# Patient Record
Sex: FEMALE | Race: WHITE | NOT HISPANIC OR LATINO | Employment: UNEMPLOYED | URBAN - METROPOLITAN AREA
[De-identification: names, ages, dates, MRNs, and addresses within clinical notes are randomized per-mention and may not be internally consistent; named-entity substitution may affect disease eponyms.]

---

## 2023-07-25 DIAGNOSIS — O09.92 HIGH-RISK PREGNANCY IN SECOND TRIMESTER: Primary | ICD-10-CM

## 2023-08-03 ENCOUNTER — OFFICE VISIT (OUTPATIENT)
Dept: MATERNAL FETAL MEDICINE | Facility: CLINIC | Age: 33
End: 2023-08-03
Payer: MEDICAID

## 2023-08-03 VITALS
OXYGEN SATURATION: 98 % | BODY MASS INDEX: 33.04 KG/M2 | RESPIRATION RATE: 20 BRPM | WEIGHT: 218 LBS | HEIGHT: 68 IN | DIASTOLIC BLOOD PRESSURE: 60 MMHG | SYSTOLIC BLOOD PRESSURE: 112 MMHG | HEART RATE: 84 BPM

## 2023-08-03 DIAGNOSIS — Q51.28 UTERINE SEPTUM: Primary | ICD-10-CM

## 2023-08-03 DIAGNOSIS — N96 HISTORY OF MULTIPLE MISCARRIAGES: ICD-10-CM

## 2023-08-03 DIAGNOSIS — Z82.79 FAMILY HISTORY OF CONGENITAL HEART DEFECT: ICD-10-CM

## 2023-08-03 DIAGNOSIS — O09.92 HIGH-RISK PREGNANCY IN SECOND TRIMESTER: ICD-10-CM

## 2023-08-03 PROCEDURE — 99203 PR OFFICE/OUTPT VISIT, NEW, LEVL III, 30-44 MIN: ICD-10-PCS | Mod: TH,25,S$GLB, | Performed by: OBSTETRICS & GYNECOLOGY

## 2023-08-03 PROCEDURE — 76811 OB US DETAILED SNGL FETUS: CPT | Mod: S$GLB,,, | Performed by: OBSTETRICS & GYNECOLOGY

## 2023-08-03 PROCEDURE — 99203 OFFICE O/P NEW LOW 30 MIN: CPT | Mod: TH,25,S$GLB, | Performed by: OBSTETRICS & GYNECOLOGY

## 2023-08-03 PROCEDURE — 1159F PR MEDICATION LIST DOCUMENTED IN MEDICAL RECORD: ICD-10-PCS | Mod: CPTII,S$GLB,, | Performed by: OBSTETRICS & GYNECOLOGY

## 2023-08-03 PROCEDURE — 3074F SYST BP LT 130 MM HG: CPT | Mod: CPTII,S$GLB,, | Performed by: OBSTETRICS & GYNECOLOGY

## 2023-08-03 PROCEDURE — 3074F PR MOST RECENT SYSTOLIC BLOOD PRESSURE < 130 MM HG: ICD-10-PCS | Mod: CPTII,S$GLB,, | Performed by: OBSTETRICS & GYNECOLOGY

## 2023-08-03 PROCEDURE — 1159F MED LIST DOCD IN RCRD: CPT | Mod: CPTII,S$GLB,, | Performed by: OBSTETRICS & GYNECOLOGY

## 2023-08-03 PROCEDURE — 3008F PR BODY MASS INDEX (BMI) DOCUMENTED: ICD-10-PCS | Mod: CPTII,S$GLB,, | Performed by: OBSTETRICS & GYNECOLOGY

## 2023-08-03 PROCEDURE — 3008F BODY MASS INDEX DOCD: CPT | Mod: CPTII,S$GLB,, | Performed by: OBSTETRICS & GYNECOLOGY

## 2023-08-03 PROCEDURE — 3078F PR MOST RECENT DIASTOLIC BLOOD PRESSURE < 80 MM HG: ICD-10-PCS | Mod: CPTII,S$GLB,, | Performed by: OBSTETRICS & GYNECOLOGY

## 2023-08-03 PROCEDURE — 3078F DIAST BP <80 MM HG: CPT | Mod: CPTII,S$GLB,, | Performed by: OBSTETRICS & GYNECOLOGY

## 2023-08-03 PROCEDURE — 76811 PR US, OB FETAL EVAL & EXAM, TRANSABDOM,FIRST GESTATION: ICD-10-PCS | Mod: S$GLB,,, | Performed by: OBSTETRICS & GYNECOLOGY

## 2023-08-03 NOTE — PROGRESS NOTES
Initial Maternal-Fetal Medicine evaluation:    Indications:  1.  Pregnancy at 18 weeks 6 days   2. Septate uterus  3. Father baby with spina bifida occulta  4. Daughter born with a heart murmur.    5.  History of 3 spontaneous miscarriages, 3 elective termination of the pregnancy, and 1 full-term delivery.      Dear Yasmin Villegas CNM,     Thank you very much for asking us see your patient.  She presents today as a 32-year-old  8 para 1061.  She is currently 18 weeks 6 days.  She was referred because of having a septate uterus.  This was thought to have cause 3 spontaneous miscarriages.  She also has a history of 3 elective termination of pregnancies.  None of these were associated with any major long-term complications.  Her only discussed all pregnancy delivered on August 15, 2019.  The baby weighed 8 lb and 10 oz and was a spontaneous vaginal delivery.  The baby was born with a person heart murmur, and an umbilical hernia, but the child is doing well now.    This patient's past medical history is positive for having depression, anemia, posttraumatic stress disorder, and the septate uterus.    Past surgeries include wisdom teeth extraction.      Family history is positive for spina bifida occulta in the father this baby.  She also has a daughter with the persistent heart murmur.  She is not required surgery.      Social history:  She is a former smoker of both cigarettes and THC.  She claims to have quit since .  She has a history of substance abuse, but nothing during the current pregnancy.    Physical findings:  In general is healthy-appearing female in no distress.  Blood pressure 112/60, pulse 84.  Her face is not edematous and eyes are nonicteric.  Nose and throat are clear.  The abdominal exam is benign.  The uterus is nontender and appropriate size the gestational age.  No peripheral edema is noted.  Reflexes are 1+ and equal bilaterally.    Ultrasound findings:  Please note that a separate  report will be provided to you which will have a detailed description of our findings today.  A brief summary is single fetus is seen.  There is adequate amniotic fluid present.  The placenta is anterior without evidence of placenta previa.  Biometric measurements document normal growth which also confirmed the gestational age and estimated date of confinement.  The anatomical survey was somewhat difficult to adequately visualize because of the fetal positioning, but no anomalies are detected.  The cervix is long and closed.  I carefully searched and found no evidence of a uterine septum.    Impression:   1. Pregnancy 18 weeks 6 days   2. A history of multiple first-trimester miscarriages and suspicion for uterine septum.  I could not see a uterine septum today.  The fact that she had 1 full-term delivery indicates that the prognosis for another full-term delivery is actually quite good at this point.  The cervix being long and closed indicates no evidence of cervical insufficiency.  3.  She had a previous child born with a heart murmur.  She was told that something that was supposed to close does not close, but she never required surgery and is doing well now.  Views of the fetal heart were not ideal today because of the early gestational age, and the fetal positioning.  However, I did not see any specific heart defects.  The recurrence risk for congenital heart defects is 2 to 5 %.  4.  The father baby has spina bifida occulta.  There is no evidence of this in the current fetus she is carrying.  Views of the spine today were adequate.    Recommendations:  1.  With your permission I would like see back in 4 weeks for repeat assessment fetal growth well being and to try to visualize the entire fetal anatomy better.  2. If inadequate views of fetal heart or again obtained, we may refer this patient for a formal fetal echocardiogram with Dr. Cuellar, because of a previous child was born with an apparent congenital  heart defect.  3.  Patients who have a uterine septum are at increased risk for miscarriage, and  delivery.  However, because I do not see uterine septum, and because she had a term delivery in her only successful pregnancy, I feel the risk for  birth is probably the same as the general population risk.  Another reassuring factor is the cervix being long and closed at this time.      Thanks again for the referral.  Please feel free to call us with any questions.

## 2023-08-03 NOTE — LETTER
August 3, 2023        Yasmin Villegas CNM  [address] [facility] [address]             [place] - Maternal Fetal Medicine  [address]  [place] [address]  Phone: [phone]  Fax: [phone]   Patient: Carla Liriano   MR Number: [id number]   YOB: 1990   Date of Visit: 8/3/2023       Dear Dr. Foley:    Thank you for referring Carla Liriano to me for evaluation. Attached you will find relevant portions of my assessment and plan of care.    If you have questions, please do not hesitate to call me. I look forward to following Carla Liriano along with you.    Sincerely,      Matthew Augustin MD            CC  No Recipients    Enclosure

## 2023-08-03 NOTE — PROGRESS NOTES
"Carla is here for initial MFM consultation, referred by Yasmin Villegas CNM at Dr. Alaina Jr's office for septate uterus, FOB with spina bifida occulta.    She is feeling fetal movement "since 10 weeks."    Carla denies vaginal bleeding, loss of fluid, recurrent cramping.          Vitals:    08/03/23 1233   BP: 112/60   Pulse: 84   Resp: 20   SpO2: 98%   Weight: 98.9 kg (218 lb)   Height: 5' 8" (1.727 m)      BMI:                    33.15 kg/m^2               "

## 2023-08-21 DIAGNOSIS — O09.92 HIGH-RISK PREGNANCY IN SECOND TRIMESTER: Primary | ICD-10-CM

## 2023-09-07 ENCOUNTER — PROCEDURE VISIT (OUTPATIENT)
Dept: MATERNAL FETAL MEDICINE | Facility: CLINIC | Age: 33
End: 2023-09-07
Payer: MEDICAID

## 2023-09-07 VITALS
DIASTOLIC BLOOD PRESSURE: 52 MMHG | OXYGEN SATURATION: 99 % | HEART RATE: 90 BPM | RESPIRATION RATE: 18 BRPM | SYSTOLIC BLOOD PRESSURE: 116 MMHG | WEIGHT: 222 LBS | BODY MASS INDEX: 33.75 KG/M2

## 2023-09-07 DIAGNOSIS — O09.92 HIGH-RISK PREGNANCY IN SECOND TRIMESTER: ICD-10-CM

## 2023-09-07 PROCEDURE — 76816 OB US FOLLOW-UP PER FETUS: CPT | Mod: NDTC,S$GLB,, | Performed by: OBSTETRICS & GYNECOLOGY

## 2023-09-07 PROCEDURE — 76816 PR  US,PREGNANT UTERUS,F/U,TRANSABD APP: ICD-10-PCS | Mod: NDTC,S$GLB,, | Performed by: OBSTETRICS & GYNECOLOGY

## 2023-09-07 PROCEDURE — 76817 TRANSVAGINAL US OBSTETRIC: CPT | Mod: NDTC,S$GLB,, | Performed by: OBSTETRICS & GYNECOLOGY

## 2023-09-07 PROCEDURE — 76817 PR US, OB, TRANSVAG APPROACH: ICD-10-PCS | Mod: NDTC,S$GLB,, | Performed by: OBSTETRICS & GYNECOLOGY

## 2023-09-07 NOTE — PROGRESS NOTES
Carla is here for followup [facility] consultation for septate uterus, FOB with spina bifida occulta, referred by Yasmin Villegas CNM.    She is feeling fetal movement.    Carla denies vaginal bleeding, loss of fluid, recurrent cramping.    Vitals:    09/07/23 1328   BP: (!) 116/52   Pulse: 90   Resp: 18   SpO2: 99%   Weight: 100.7 kg (222 lb)     BMI:                    33.75 kg/m^2

## 2023-09-07 NOTE — PROGRESS NOTES
Follow up Maternal-Fetal Medicine evaluation via Telemedicine:    Indications:  1.  Pregnancy at 23 weeks 6 days   2.  Previous concern for Septate uterus however not confirmed with [facility]  3. Father baby with spina bifida occulta  4. Daughter born with a heart murmur.    5.  History of 3 spontaneous miscarriages, 3 elective termination of the pregnancy, and 1 full-term delivery.    6. Obesity Class 1     Dear Yasmin Villegas CNM,     Thank you very much for asking us see your patient.  She presents today as a 32-year-old  8 para 1061.  She is currently 23 weeks 6 days.  She was referred because of having a septate uterus however with her last visit here Dr. Augustin did not see evidence of this.  She reports that her pregnancy has been progressing well.  Denies any cramping, contractions, LOF or vaginal bleeding and does report good fetal movement.        Physical findings:  Vitals:    23 1328   BP: (!) 116/52   Pulse: 90   Resp: 18     Physical exam:  Gen: WDWN in NAD  HEENT: WNL  Abdomen: gravid  Skin: No rash or jaundice  Extremities: Symmetric in size, no clubbing, cyanosis, or edema  Neuro: Grossly intact  Exam via Telemedicine      Ultrasound findings:  Single intrauterine pregnancy measuring 23 weeks and 6 days with an estimated fetal weight of 617g.  This is consistent with a previously determined HUSSEIN.  The fetus is in the breech presentation.  The placenta is anterior without evidence of previa.  Measures greater than 3cm for the internal cervical os.  The amniotic fluid is normal.  Completion of the anatomical survey was performed and there are no structural anomalies or aneuploidy on ultrasound today.  I did get excellent views of the heart today and everything appears to be within normal limits.  Her cervical length today was also greater than 5cm.        Impression:   1. Pregnancy 23 weeks 6 days with normal growth and fluid and anatomy  2. Normal cervical length    Recommendations:  1.   Patient was instructed to keep all of her upcoming appointments with you.  At this point time no further follow-up is recommended in the Maternal-Fetal medicine office.  2.  Again I could not appreciate significant septum on today's ultrasound and I do agree that the likelihood of  labor is low given that she has had a term previous delivery.  Her cervical length here today is within normal limits.  3. If anything should arise later on in the pregnancy please do not hesitate to refer back to maternal fetal Medicine.    Thanks again for the referral.  Please feel free to call us with any questions.      Sincerely,     Amanda Pereira, DO

## 2023-09-07 NOTE — LETTER
September 7, 2023        Yasmin Villegas CNM  [address] [facility] [address]             [place] - Maternal Fetal Medicine  [address]  [place] [address]  Phone: [phone]  Fax: [phone]   Patient: Carla Liriano   MR Number: [id number]   YOB: 1990   Date of Visit: 9/7/2023       Dear Ms. Villegas:    Thank you for referring Carla Liriano to me for evaluation. Below are the relevant portions of my assessment and plan of care.            If you have questions, please do not hesitate to call me. I look forward to following Carla along with you.    Sincerely,      Amanda Pereira, DO           CC  No Recipients

## 2024-10-29 DIAGNOSIS — O09.92 HIGH-RISK PREGNANCY IN SECOND TRIMESTER: Primary | ICD-10-CM

## 2024-11-11 ENCOUNTER — OFFICE VISIT (OUTPATIENT)
Dept: MATERNAL FETAL MEDICINE | Facility: CLINIC | Age: 34
End: 2024-11-11
Payer: MEDICAID

## 2024-11-11 VITALS
BODY MASS INDEX: 36.8 KG/M2 | RESPIRATION RATE: 20 BRPM | HEART RATE: 105 BPM | SYSTOLIC BLOOD PRESSURE: 120 MMHG | OXYGEN SATURATION: 98 % | WEIGHT: 242 LBS | DIASTOLIC BLOOD PRESSURE: 60 MMHG

## 2024-11-11 DIAGNOSIS — O09.92 HIGH-RISK PREGNANCY IN SECOND TRIMESTER: ICD-10-CM

## 2024-11-11 PROCEDURE — 3074F SYST BP LT 130 MM HG: CPT | Mod: CPTII,,, | Performed by: OBSTETRICS & GYNECOLOGY

## 2024-11-11 PROCEDURE — 3078F DIAST BP <80 MM HG: CPT | Mod: CPTII,,, | Performed by: OBSTETRICS & GYNECOLOGY

## 2024-11-11 PROCEDURE — 76811 OB US DETAILED SNGL FETUS: CPT | Mod: 26,S$PBB,, | Performed by: OBSTETRICS & GYNECOLOGY

## 2024-11-11 PROCEDURE — 1159F MED LIST DOCD IN RCRD: CPT | Mod: CPTII,,, | Performed by: OBSTETRICS & GYNECOLOGY

## 2024-11-11 PROCEDURE — 3008F BODY MASS INDEX DOCD: CPT | Mod: CPTII,,, | Performed by: OBSTETRICS & GYNECOLOGY

## 2024-11-11 PROCEDURE — 99203 OFFICE O/P NEW LOW 30 MIN: CPT | Mod: 25,S$PBB,TH, | Performed by: OBSTETRICS & GYNECOLOGY

## 2024-11-11 RX ORDER — SYRINGE WITH NEEDLE, INSULIN
1 SYRINGE, EMPTY DISPOSABLE MISCELLANEOUS
COMMUNITY
Start: 2024-08-28

## 2024-11-11 NOTE — PROGRESS NOTES
Carla is here for initial MFM consultation, referred by Yasmin Villegas CNM for ? Uterine septum, FOB born with spina bifida, 1st child with ASD. She was seen in our clinic 9/7/23.    She is feeling fetal movement.    Carla denies vaginal bleeding, loss of fluid, recurrent cramping.      Vitals:    11/11/24 1329   BP: 120/60   Pulse: 105   Resp: 20   SpO2: 98%   Weight: 109.8 kg (242 lb)      BMI:                    36.8 kg/m^2

## 2024-11-11 NOTE — LETTER
November 11, 2024        Yasmin Villegas CNM  [address] [facility] [address]             [place] - Maternal Fetal Medicine  [address]  [place] [address]  Phone: [phone]  Fax: [phone]   Patient: Carla Liriano   MR Number: [id number]   YOB: 1990   Date of Visit: 11/11/2024       Dear Dr. Foley:    Thank you for referring Carla Liriano to me for evaluation. Attached you will find relevant portions of my assessment and plan of care.    If you have questions, please do not hesitate to call me. I look forward to following Carla Liriano along with you.    Sincerely,      Clifton Sosa MD            CC  No Recipients    Enclosure

## 2024-11-11 NOTE — PROGRESS NOTES
Maternal Fetal Medicine Consultation Note    Carla Liriano  ( 1990)    Reason for Consultation    Intrauterine pregnancy at 16w3d  History of multiple miscarriages  Possible uterine septum  Family history of congenital cardiac disease    Provider requesting consultation:  Yasmin Villegas CNM    Dear Yasmin,    Thank you very much for your referral of Carla Liriano who is a 33 y.o.  at 16w3d gestation to the Maternal Fetal Medicine Center of [facility].  Her Estimated Date of Delivery: 25.  I appreciate your request for imaging and consultation.    Past Medical History:    Your patient denies a history of hypertension, diabetes, cardiac disease, pulmonary disease, renal disease, autoimmune disease, spontaneous thrombosis, thyroid disease, and neurologic disease.  History of depression and  PTSD.    Obstetric History:  Patient was pregnant .  She delivered vaginally.  That child had an ASD.  She had a delivery in  at 40 weeks the baby weighed 7 lb 14 oz. it was a precipitous delivery.  The patient has had 3 elective interruptions of pregnancy.  She has also had 3 spontaneous miscarriages.    PSH:  [place] tooth extraction    Family hx:   Noncontributory.  The history is negative for congenital  cardiac defects, open neural tube defect, Down syndrome, aneuploidy, and common single gene disorders.      Social History:  Your patient denies tobacco use, alcohol use, and recreational drug use while pregnant.      Review of patient's allergies indicates:   Allergen Reactions    Levofloxacin Hives        Review of systems:  Your patient denies nausea, vomiting, musculoskeletal pain, weakness, headache, and pelvic pressure.    Physical exam:  Vitals:    24 1329   BP: 120/60   Pulse: 105   Resp: 20   SpO2: 98%   Weight: 109.8 kg (242 lb)     Body mass index is 36.8 kg/m².    General:    This is a well-developed well-nourished female in no apparent distress  HEENT:  Grossly within  normal limits.   No facial edema is noted.  The sclera appear normal  Abdomen:  Benign exam.  The uterus is nontender to palpation.  No masses are  noted.  Extremities: No significant peripheral edema is palpable.  Neuro: Grossly intact.  The patient is alert and oriented x3.  She ambulates without issue.  Psych:  The patient is appropriate for both mood and affect.    Ultrasound: A maternal fetal medicine ultrasound was performed today.  Please SEE ATTACHED REPORT IN THE [employer] [facility] for full details.  In summary, all looks well today at 16 weeks.  I got pretty good views of the heart not think there is a four-chamber with normal outflow tracts but would categorize it was suboptimal due to early gestation.  The remainder of the anatomy appears normal.  Fetal growth is appropriate.    Counseling:  The patient was counseled I do not think she has a uterine septum.  Be very unlikely for if gone to term twice with a uterine septum.  Additionally, it likely would have been noted on 1 of her prior miscarriages/D and C's.  She expressed understanding agreed.  She was informed that with a child with an ASD she probably has about a 5% chance of cardiac disease in the current pregnancy.  Today's imaging is reassuring.  She was informed we will see her back in 8 weeks which does an excellent time to look at the entire pregnancy including the heart.  She was made aware that if all looks well at that point then we likely will not have to see her back again.  As always, she was encouraged to live a healthy lifestyle with a well-rounded diet and to remain active during the pregnancy.    Impression:  Our initial [facility] assessment today is reassuring.  I see no evidence of fetal growth disturbance or an early anatomic abnormality.  The patient feels well and has no complaints.  She has no significant systemic disease.    Recommendations:  With your permission we will see the patient back here in the MFM Center in 8 weeks   The patient  was encouraged to live a healthy lifestyle.  Please call us if you have any question about the care of your patient.    Thanks once again for allowing us to participate in the care of your patient.  If you have any questions please feel free to call us at any time.    Sincerely,     Clifton Sosa MD  Maternal-Fetal Medicine     I spent a total of 30 minutes on this visit. This includes face to face time and non-face to face time preparing to see the patient (eg, review of tests), obtaining and/or reviewing separately obtained history, documenting clinical information in the electronic or other health record, independently interpreting results and communicating results to the patient/family/caregiver, or care coordinator.

## 2024-12-23 DIAGNOSIS — O09.92 HIGH-RISK PREGNANCY IN SECOND TRIMESTER: Primary | ICD-10-CM

## 2025-01-06 ENCOUNTER — PROCEDURE VISIT (OUTPATIENT)
Dept: MATERNAL FETAL MEDICINE | Facility: CLINIC | Age: 35
End: 2025-01-06
Payer: MEDICAID

## 2025-01-06 VITALS
BODY MASS INDEX: 37.4 KG/M2 | WEIGHT: 246 LBS | HEART RATE: 64 BPM | OXYGEN SATURATION: 99 % | DIASTOLIC BLOOD PRESSURE: 70 MMHG | RESPIRATION RATE: 20 BRPM | SYSTOLIC BLOOD PRESSURE: 108 MMHG

## 2025-01-06 DIAGNOSIS — O09.92 HIGH-RISK PREGNANCY IN SECOND TRIMESTER: ICD-10-CM

## 2025-01-06 PROCEDURE — 99214 OFFICE O/P EST MOD 30 MIN: CPT | Mod: S$PBB,TH,, | Performed by: OBSTETRICS & GYNECOLOGY

## 2025-01-06 PROCEDURE — 76816 OB US FOLLOW-UP PER FETUS: CPT | Mod: 26,S$PBB,, | Performed by: OBSTETRICS & GYNECOLOGY

## 2025-01-06 RX ORDER — OMEPRAZOLE 20 MG/1
20 CAPSULE, DELAYED RELEASE ORAL 2 TIMES DAILY
COMMUNITY
Start: 2024-12-29

## 2025-01-06 RX ORDER — GLUCOSAM/CHONDRO/HERB 149/HYAL 750-100 MG
TABLET ORAL
COMMUNITY

## 2025-01-06 NOTE — PROGRESS NOTES
Carla is here for followup [facility] consultation for family history (daughter) with VSD, and FOB with spina bifida occulta referred by Yasmin Villegas CNM.    She is feeling fetal movement.    Carla denies vaginal bleeding, loss of fluid, recurrent contractions.    She is taking Omeprazole 20 mg PO daily, prenatal vitamin and Omega 3 fish oil daily.    Vitals:    01/06/25 1202   BP: 108/70   Pulse: 64   Resp: 20   SpO2: 99%   Weight: 111.6 kg (246 lb)     BMI:                    37.4 kg/m^2

## 2025-01-06 NOTE — LETTER
January 6, 2025        Sohail Foley MD  [address] [facility] [address]             [place] - Maternal Fetal Medicine  [address]  [place] [address]  Phone: [phone]  Fax: [phone]   Patient: Carla Liriano   MR Number: [id number]   YOB: 1990   Date of Visit: 1/6/2025       Dear Ms. Villegas:    Thank you for referring Carla Liriano to me for evaluation. Below are the relevant portions of my assessment and plan of care.            If you have questions, please do not hesitate to call me. I look forward to following Carla along with you.    Sincerely,      Amanda Pereira, DO           CC  No Recipients

## 2025-01-06 NOTE — PROGRESS NOTES
Maternal Fetal Medicine Consultation Note    Carla Liriano  ( 1990)    Reason for Consultation    Intrauterine pregnancy at 24w3d  History of multiple miscarriages  Possible uterine septum  Family history of congenital cardiac disease    Provider requesting consultation:  Yasmin Villegas CNM    Dear Yasmin,    Thank you very much for your referral of Carla Liriano who is a 34 y.o.  at 24w3d gestation to the Maternal Fetal Medicine Center of [facility].  Her Estimated Date of Delivery: 25.  I appreciate your request for imaging and consultation.      Review of systems:  Your patient denies nausea, vomiting, musculoskeletal pain, weakness, headache, and pelvic pressure.    Physical exam:  /70   Pulse 64   Resp 20   Wt 111.6 kg (246 lb)   SpO2 99%   BMI 37.40 kg/m²    General:    This is a well-developed well-nourished female in no apparent distress  HEENT:  Grossly within normal limits.   No facial edema is noted.  The sclera appear normal  Abdomen:  Benign exam.  The uterus is nontender to palpation.  No masses are  noted.  Extremities: No significant peripheral edema is palpable.  Neuro: Grossly intact.  The patient is alert and oriented x3.  She ambulates without issue.  Psych:  The patient is appropriate for both mood and affect.    Ultrasound: Single intrauterine pregnancy measuring 24 weeks and 4 days with an estimated fetal weight of 697g.  This is consistent with a previously determined HUSSEIN and is at the 44th percentile.  The fetus is in the breech presentation.  The placenta is posterior.  The amniotic fluid is normal.  Completion of the anatomical survey was performed and there are no structural anomalies or aneuploidy on ultrasound today.  The fetal heart rate is 137bpm and occasional PAC is seen.       Counseling:  Today's ultrasound was of reassurance and there are no structural anomalies or aneuploidy.  There is a rare PAC that is seen and discussed follow  up evaluation in a month.     Impression:  Reassuring fetal anatomy  Rare PAC seen    Recommendations:  With your permission we will see the patient back here in the [facility] in 5-6 weeks   The patient was encouraged to live a healthy lifestyle.  Please call us if you have any question about the care of your patient.    Thanks once again for allowing us to participate in the care of your patient.  If you have any questions please feel free to call us at any time.    Sincerely,     Amanda Pereira, DO  Maternal-Fetal Medicine

## 2025-02-03 DIAGNOSIS — O09.93 HIGH-RISK PREGNANCY IN THIRD TRIMESTER: Primary | ICD-10-CM

## 2025-02-10 ENCOUNTER — PROCEDURE VISIT (OUTPATIENT)
Dept: MATERNAL FETAL MEDICINE | Facility: CLINIC | Age: 35
End: 2025-02-10
Payer: MEDICAID

## 2025-02-10 VITALS
OXYGEN SATURATION: 99 % | RESPIRATION RATE: 18 BRPM | SYSTOLIC BLOOD PRESSURE: 104 MMHG | HEART RATE: 69 BPM | DIASTOLIC BLOOD PRESSURE: 60 MMHG | BODY MASS INDEX: 37.71 KG/M2 | WEIGHT: 248 LBS

## 2025-02-10 DIAGNOSIS — O09.93 HIGH-RISK PREGNANCY IN THIRD TRIMESTER: ICD-10-CM

## 2025-02-10 PROCEDURE — 99214 OFFICE O/P EST MOD 30 MIN: CPT | Mod: S$PBB,TH,, | Performed by: OBSTETRICS & GYNECOLOGY

## 2025-02-10 PROCEDURE — 76816 OB US FOLLOW-UP PER FETUS: CPT | Mod: 26,S$PBB,, | Performed by: OBSTETRICS & GYNECOLOGY

## 2025-02-10 PROCEDURE — 76817 TRANSVAGINAL US OBSTETRIC: CPT | Mod: 26,S$PBB,, | Performed by: OBSTETRICS & GYNECOLOGY

## 2025-02-10 NOTE — LETTER
February 10, 2025        Yasmin Villegas CNM  [address]   [place] [address]             [place] - Maternal Fetal Medicine  [address]  [place] [address]  Phone: [phone]  Fax: [phone]   Patient: Carla Liriano   MR Number: [id number]   YOB: 1990   Date of Visit: 2/10/2025       Dear Ms. Villegas:    Thank you for referring Carla Liriano to me for evaluation. Below are the relevant portions of my assessment and plan of care.            If you have questions, please do not hesitate to call me. I look forward to following Carla along with you.    Sincerely,      Clifton Park MD           CC  No Recipients

## 2025-02-10 NOTE — PROGRESS NOTES
Carla is here for followup [facility] consultation for daughter with VSD and FOB with spina bifida occulta, referred by Yasmin Villegas CNM.    She is feeling fetal movement.    Carla denies vaginal bleeding, loss of fluid, recurrent contractions.    She is taking Omeprazole 20 mg PO daily.    Vitals:    02/10/25 1000   BP: 104/60   Pulse: 69   Resp: 18   SpO2: 99%   Weight: 112.5 kg (248 lb)     BMI:                    37.71 kg/m^2

## 2025-02-10 NOTE — PROGRESS NOTES
Reason For Consultation    Pregnancy at 29w3d weeks gestation   History of multiple miscarriages  Possible uterine septum  Family history of congenital cardiac disease  Advanced maternal age    Provider requesting consultation:  Yasmin Villegas CNM     Dear Yasmin,    I had the pleasure of seeing Carla Liriano at the [facility] Maternal Fetal Medicine center. I appreciate your request for follow up imaging and consultation.   Your patient is a 34 y.o. at 29w3d.  Estimated Date of Delivery: 25.  Today, the patient returns for follow-up assessment due to the issues as noted above.  Today she has no significant complaints.    Review of systems:  The patient denies vaginal bleeding, leakage of amniotic fluid, contractions, headache, visual change, and right upper quadrant pain.    Physical exam:  Vitals:    02/10/25 1000   BP: 104/60   Pulse: 69   Resp: 18   SpO2: 99%   Weight: 112.5 kg (248 lb)     Body mass index is 37.71 kg/m².    General:  The patient is a well developed gravid female in no apparent distress.  HEENT: Grossly within normal limits.  No facial edema.  The sclera appear normal.  Abdomen: Exam is benign.  Uterus non tender to palpation.  No masses noted  Extremities: No significant peripheral edema  Neuro: Grossly intact.  Alert and oriented.  Ambulates without issue  Psych:  The patient is appropriate for both mood and affect    Ultrasound: A repeat maternal fetal medicine ultrasound was performed today.  Please SEE THE FULL ULTRASOUND REPORT IN THE EPIC SYSTEM for full details.  In summary, we find a [place] gestation with normal fluid and movement.  No anatomic abnormality is noted.  Fetal growth is appropriate.  No PVCs were seen today.    Of note, today's imaging appears to show a posterior placenta previa.  This was not noted on prior studies.  This raises suspicion that there could be a posterior contraction today that is pushing the placenta anterior.  However, I do  believe that it is likely that this is a real previa previa.    Counseling:  The patient was counseled about the new finding of a previa.  I discussed the difference between a true previa in 1 that is falsely visualized because there is a contraction.  I advised her that we need to take 1 more look in approximately 4 weeks with the vaginal probe transducer.  If they are still the appearance of a previa then she is going to require a abdominal birth at 36 to 37 weeks gestation.  She expressed understanding.    Impression:  Pregnancy at 29 weeks with normal growth, fluid, and anatomy.  No PVCs of the fetus.  Probable posterior placenta previa.  No history of vaginal bleeding.  The maternal condition stable without any maternal complaints    Recommendations:  With your permission we will see the patient back in 4 weeks in the [facility].  At that time we will repeat our assessment of fetal growth and development and also visualize the distal edge of the placenta for its position in relation to the internal cervical os  The patient was cautioned to come to the hospital if she developed significant vaginal bleeding    We greatly appreciate you allowing us to assist in her care.  Please call with any questions or concerns.    Sincerely,     Clifton Sosa MD  Maternal Fetal Medicine    I spent a total of 30 minutes on this visit. This includes face to face time and non-face to face time preparing to see the patient (eg, review of tests), obtaining and/or reviewing separately obtained history, documenting clinical information in the electronic or other health record, independently interpreting results and communicating results to the patient/family/caregiver, or care coordinator.

## 2025-02-18 DIAGNOSIS — O44.03 PLACENTA PREVIA IN THIRD TRIMESTER: ICD-10-CM

## 2025-02-18 DIAGNOSIS — Z82.79 FAMILY HISTORY OF CONGENITAL HEART DEFECT: Primary | ICD-10-CM

## 2025-03-06 ENCOUNTER — PROCEDURE VISIT (OUTPATIENT)
Dept: MATERNAL FETAL MEDICINE | Facility: CLINIC | Age: 35
End: 2025-03-06
Payer: MEDICAID

## 2025-03-06 VITALS
WEIGHT: 248 LBS | SYSTOLIC BLOOD PRESSURE: 100 MMHG | DIASTOLIC BLOOD PRESSURE: 64 MMHG | RESPIRATION RATE: 20 BRPM | HEART RATE: 72 BPM | OXYGEN SATURATION: 98 % | BODY MASS INDEX: 37.71 KG/M2

## 2025-03-06 DIAGNOSIS — O44.03 PLACENTA PREVIA IN THIRD TRIMESTER: ICD-10-CM

## 2025-03-06 DIAGNOSIS — Z82.79 FAMILY HISTORY OF CONGENITAL HEART DEFECT: ICD-10-CM

## 2025-03-06 PROCEDURE — 99213 OFFICE O/P EST LOW 20 MIN: CPT | Mod: S$PBB,TH,, | Performed by: OBSTETRICS & GYNECOLOGY

## 2025-03-06 PROCEDURE — 76816 OB US FOLLOW-UP PER FETUS: CPT | Mod: 26,S$PBB,, | Performed by: OBSTETRICS & GYNECOLOGY

## 2025-03-06 PROCEDURE — 76817 TRANSVAGINAL US OBSTETRIC: CPT | Mod: 26,S$PBB,, | Performed by: OBSTETRICS & GYNECOLOGY

## 2025-03-06 NOTE — PROGRESS NOTES
Carla is here for followup [facility] consultation for daughter born with VSD and FOB with spina bifida occulta, referred by Yasmin Villegas CNM.    She is feeling fetal movement.    Carla denies vaginal bleeding, loss of fluid, but does feel occasional contractions.    She takes Omeprazole 20 mg PO daily.    Vitals:    03/06/25 1215   BP: 100/64   Pulse: 72   Resp: 20   SpO2: 98%   Weight: 112.5 kg (248 lb)     BMI:                    37.71 kg/m^2

## 2025-03-06 NOTE — PROGRESS NOTES
Reason For Consultation    Pregnancy at 32w6d weeks gestation   History of multiple miscarriages   Possible uterine septum  Family history of congenital cardiac disease   Advanced maternal age  Posterior placenta previa    Provider requesting consultation:  Yasmin Villegas CNM    Dear Yasmin,    I had the pleasure of seeing Carla Liriano at the [facility] center. I appreciate your request for follow up imaging and consultation.   Your patient is a 34 y.o. at 32w6d.  Estimated Date of Delivery: 25.  Today, the patient denies vaginal bleeding or any other pregnancy problem.  She does not complain of the various aches and pains of the 3rd trimester.    Review of systems:  The patient denies vaginal bleeding, leakage of amniotic fluid, contractions, headache, visual change, and right upper quadrant pain.    Physical exam:  Vitals:    25 1215   BP: 100/64   Pulse: 72   Resp: 20   SpO2: 98%   Weight: 112.5 kg (248 lb)     Body mass index is 37.71 kg/m².    General:  The patient is a well developed gravid female in no apparent distress.  HEENT: Grossly within normal limits.  No facial edema.  The sclera appear normal.  Abdomen: Exam is benign.  Uterus non tender to palpation.  No masses noted  Extremities: No significant peripheral edema  Neuro: Grossly intact.  Alert and oriented.  Ambulates without issue  Psych:  The patient is appropriate for both mood and affect    Ultrasound: A repeat maternal fetal medicine ultrasound was performed today.  Please SEE THE FULL ULTRASOUND REPORT IN THE EPIC SYSTEM for full details.  In summary, imaging reveals a Talavera in the vertex presentation.  The fluid volume is normal.  No significant fetal anatomic abnormality is seen.  The estimated fetal weight is 2158 g.  This is equivalent to 4 lb 12 oz. this is in the middle of a bell-shaped curve.  It is the 32nd percentile.      The placenta is located posteriorly.  The measured  from the internal cervical os to the lowest edge of the placenta is 14 mm.  This is a low-lying placenta    Counseling:  The patient is counseled that she has a low-lying placenta.  When the distance from the lowest edge of the placenta to the internal cervical os is less than 10 mm a  birth is typically recommended.  If that distance is greater than 20 mm vaginal birth is typically advised.  From 10 mm to 20 mm away from the internal cervical os is determined to be intermediate.  There is no clear evidence on proceeding with  or vaginal birth.      I believe it is reasonable to do a trial of labor and if there was no significant bleeding the patient should have an uncomplicated vaginal birth.  If there is increased bleeding during labor then you should move to  birth at that time.  However, if you and the patient wished to proceed directly with  that would be a reasonable option as well.  I deferred to shared decision-making between the primary obstetrician and the patient.  This was all discussed with the patient and her  at length.  They were made aware that I do not think she requires a follow-up visit here in the [facility]     Impression:  My impression is that the maternal condition stable without evidence of hypertension or labor.  The fetal side of the questions also stable with normal growth, fluid and anatomy.  The placenta is low-lying.  It is in the intermediate zone.  The distance from the internal cervical os to the distal edge of the placenta is 14 mm.    Recommendations:  I defer to the shared decision-making between the patient and her primary obstetrician for determine the route of delivery.  I think that either a  or a attempted vaginal delivery are reasonable options.  I think it would be safe to attempt vaginal delivery and proceed with  for increased vaginal bleeding and this may be considered.  Please call me if  you have any question about the care of your patient  Please re-consult our service if things develop that require our assistance    We greatly appreciate you allowing us to assist in her care.  Please call with any questions or concerns.    Sincerely,     Clifton Sosa MD  Maternal Fetal Medicine    I spent a total of 26 minutes on this visit. This includes face to face time and non-face to face time preparing to see the patient (eg, review of tests), obtaining and/or reviewing separately obtained history, documenting clinical information in the electronic or other health record, independently interpreting results and communicating results to the patient/family/caregiver, or care coordinator.

## 2025-03-06 NOTE — LETTER
March 6, 2025        Sohail Foley MD  [address] [facility] [address]             [place] - Maternal Fetal [facility]  [address]  [place] [address]  Phone: [phone]  Fax: [phone]   Patient: Carla Liriano   MR Number: [id number]   YOB: 1990   Date of Visit: 3/6/2025       Dear Dr. Foley:    Thank you for referring Carla Liriano to me for evaluation. Below are the relevant portions of my assessment and plan of care.            If you have questions, please do not hesitate to call me. I look forward to following Carla along with you.    Sincerely,      Clifton Sosa MD           CC  No Recipients